# Patient Record
Sex: FEMALE | Race: WHITE | NOT HISPANIC OR LATINO | URBAN - METROPOLITAN AREA
[De-identification: names, ages, dates, MRNs, and addresses within clinical notes are randomized per-mention and may not be internally consistent; named-entity substitution may affect disease eponyms.]

---

## 2022-05-03 ENCOUNTER — NEW PATIENT (OUTPATIENT)
Dept: URBAN - METROPOLITAN AREA CLINIC 14 | Facility: CLINIC | Age: 67
End: 2022-05-03

## 2022-05-03 DIAGNOSIS — H25.13: ICD-10-CM

## 2022-05-03 DIAGNOSIS — H53.031: ICD-10-CM

## 2022-05-03 DIAGNOSIS — H43.811: ICD-10-CM

## 2022-05-03 DIAGNOSIS — H40.013: ICD-10-CM

## 2022-05-03 PROCEDURE — 92004 COMPRE OPH EXAM NEW PT 1/>: CPT

## 2022-05-03 PROCEDURE — 92133 CPTRZD OPH DX IMG PST SGM ON: CPT

## 2022-05-03 PROCEDURE — 92015 DETERMINE REFRACTIVE STATE: CPT

## 2022-05-03 ASSESSMENT — VISUAL ACUITY
OU_SC: 20/200
OD_SC: 20/80
OD_CC: J1
OU_SC: 20/80
OD_CC: 20/25+1
OU_CC: 20/25
OS_CC: J2
OS_SC: 20/200
OS_CC: 20/40
OS_SC: 20/400
OD_SC: 20/400
OU_CC: J1

## 2022-05-03 ASSESSMENT — TONOMETRY
OS_IOP_MMHG: 14
OD_IOP_MMHG: 14

## 2022-05-03 NOTE — PATIENT DISCUSSION
Was followed as a glaucoma suspect in a previous location.  Was always told that her tests were normal.

## 2022-08-17 PROBLEM — I34.1 MVP (MITRAL VALVE PROLAPSE): Status: ACTIVE | Noted: 2022-08-17

## 2022-08-18 PROBLEM — E78.5 HYPERLIPIDEMIA: Status: ACTIVE | Noted: 2022-08-18

## 2022-08-18 PROBLEM — I71.21 ASCENDING AORTIC ANEURYSM: Status: ACTIVE | Noted: 2022-08-18

## 2023-06-05 ENCOUNTER — COMPREHENSIVE EXAM (OUTPATIENT)
Dept: URBAN - METROPOLITAN AREA CLINIC 6 | Facility: CLINIC | Age: 68
End: 2023-06-05

## 2023-06-05 DIAGNOSIS — H43.811: ICD-10-CM

## 2023-06-05 DIAGNOSIS — H53.031: ICD-10-CM

## 2023-06-05 DIAGNOSIS — H40.013: ICD-10-CM

## 2023-06-05 DIAGNOSIS — H25.13: ICD-10-CM

## 2023-06-05 PROCEDURE — 92014 COMPRE OPH EXAM EST PT 1/>: CPT

## 2023-06-05 ASSESSMENT — TONOMETRY
OD_IOP_MMHG: 11
OS_IOP_MMHG: 11

## 2023-06-05 ASSESSMENT — VISUAL ACUITY
OU_CC: 20/25
OD_CC: 20/25
OS_CC: 20/30

## 2024-07-11 ENCOUNTER — COMPREHENSIVE EXAM (OUTPATIENT)
Dept: URBAN - METROPOLITAN AREA CLINIC 10 | Facility: CLINIC | Age: 69
End: 2024-07-11

## 2024-07-11 DIAGNOSIS — H40.013: ICD-10-CM

## 2024-07-11 DIAGNOSIS — H25.13: ICD-10-CM

## 2024-07-11 DIAGNOSIS — H53.031: ICD-10-CM

## 2024-07-11 DIAGNOSIS — H43.811: ICD-10-CM

## 2024-07-11 PROCEDURE — 92133 CPTRZD OPH DX IMG PST SGM ON: CPT

## 2024-07-11 PROCEDURE — 92014 COMPRE OPH EXAM EST PT 1/>: CPT

## 2024-07-11 ASSESSMENT — VISUAL ACUITY
OU_CC: 20/20-2
OS_CC: 20/30-1
OU_CC: J2
OD_CC: 20/20-2
OU_OTHER: OTC +3.00

## 2024-07-11 ASSESSMENT — TONOMETRY
OS_IOP_MMHG: 12
OD_IOP_MMHG: 12

## 2025-07-28 ENCOUNTER — COMPREHENSIVE EXAM (OUTPATIENT)
Age: 70
End: 2025-07-28

## 2025-07-28 DIAGNOSIS — H43.811: ICD-10-CM

## 2025-07-28 DIAGNOSIS — H40.013: ICD-10-CM

## 2025-07-28 DIAGNOSIS — H53.032: ICD-10-CM

## 2025-07-28 DIAGNOSIS — H25.13: ICD-10-CM

## 2025-07-28 PROCEDURE — 92014 COMPRE OPH EXAM EST PT 1/>: CPT

## 2025-07-28 PROCEDURE — 92133 CPTRZD OPH DX IMG PST SGM ON: CPT
